# Patient Record
Sex: FEMALE
[De-identification: names, ages, dates, MRNs, and addresses within clinical notes are randomized per-mention and may not be internally consistent; named-entity substitution may affect disease eponyms.]

---

## 2019-02-25 ENCOUNTER — HOSPITAL ENCOUNTER (EMERGENCY)
Dept: HOSPITAL 31 - C.ER | Age: 79
Discharge: HOME | End: 2019-02-25
Payer: MEDICARE

## 2019-02-25 VITALS — HEART RATE: 84 BPM | TEMPERATURE: 97.6 F | DIASTOLIC BLOOD PRESSURE: 75 MMHG | SYSTOLIC BLOOD PRESSURE: 144 MMHG

## 2019-02-25 VITALS — RESPIRATION RATE: 16 BRPM

## 2019-02-25 VITALS — OXYGEN SATURATION: 99 %

## 2019-02-25 DIAGNOSIS — R42: Primary | ICD-10-CM

## 2019-02-25 NOTE — C.PDOC
Time Seen by Provider: 02/25/19 17:41


Chief Complaint (Nursing): Dizziness/Lightheaded





Past Medical History


Vital Signs: 





                                Last Vital Signs











Temp  98.3 F   02/25/19 17:33


 


Pulse  70   02/25/19 17:33


 


Resp  18   02/25/19 17:33


 


BP  152/69 H  02/25/19 17:33


 


Pulse Ox  99   02/25/19 17:33














- Medical History


PMH: GERD, HTN, Hyperthyroidism, Migraine


Surgical History: Tonsillectomy





- Social History


Hx Alcohol Use: No


Hx Substance Use: No





ED Course And Treatment


O2 Sat by Pulse Oximetry: 99


Reevaluation Time: 19:03


Reassessment Condition: Improved (positional vertigo resolved)





Medical Decision Making


Medical Decision Making: 





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


Undertreated @ home.


improved with ED tx


home regimen augmented





Disposition


Doctor Will See Patient In The: Office


Counseled Patient/Family Regarding: Studies Performed, Diagnosis





- Disposition


Disposition: HOME/ ROUTINE


Disposition Time: 19:05


Condition: GOOD





- Clinical Impression


Clinical Impression: 


 Vertigo

## 2019-02-25 NOTE — C.PDOC
History Of Present Illness


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 

who presents to the ED c/o positional vertigo that began 3 days ago. Patient 

states that she takes Meclizine 1 to 2 times daily with transient improvement. 

She reports that her symptoms are worse when sitting up from a laying position 

but improve with standing up. Patient denies any headaches, nausea, vomiting, or

recent viral syndrome. 





Time Seen by Provider: 02/25/19 17:41


Chief Complaint (Nursing): Dizziness/Lightheaded


History Per: Patient


History/Exam Limitations: no limitations


Onset/Duration Of Symptoms: Days (3)


Current Symptoms Are (Timing): Still Present


Associated Symptoms Preceding Syncopal Episode: Other (better with standing 

worse with sitting up from a laying position )


Recent travel outside of the [place]: No


Additional History Per: Patient





Past Medical History


Reviewed: Historical Data, Nursing Documentation, Vital Signs


Vital Signs: 





                                Last Vital Signs











Temp  98.3 F   02/25/19 17:33


 


Pulse  70   02/25/19 17:33


 


Resp  18   02/25/19 17:33


 


BP  152/69 H  02/25/19 17:33


 


Pulse Ox  99   02/25/19 17:33














- Medical History


PMH: GERD, HTN, Hyperthyroidism, Migraine


Surgical History: Tonsillectomy


Family History: States: No Known Family Hx





- Social History


Hx Alcohol Use: No


Hx Substance Use: No





Review Of Systems


Gastrointestinal: Negative for: Nausea, Vomiting


Neurological: Positive for: Other (positional vertigo).  Negative for: Headache





Physical Exam





- Physical Exam


Appears: Non-toxic, No Acute Distress


Skin: Normal Color, Warm, Dry


Head: Atraumatic, Normacephalic


Eye(s): bilateral: Normal Inspection, Other (no sticking nystagmus )


Ear(s): Bilateral: Normal


Nose: Normal


Oral Mucosa: Moist


Throat: Normal


Neck: Normal ROM, Supple


Chest: Symmetrical, No Deformity


Cardiovascular: Rhythm Regular


Respiratory: Normal Breath Sounds


Gastrointestinal/Abdominal: Soft


Extremity: Normal ROM


Neurological/Psych: Oriented x3, Normal Speech, Normal Cognition, Normal Cranial

Nerves, Normal Motor, Normal Sensation, Normal Reflexes





ED Course And Treatment


O2 Sat by Pulse Oximetry: 99 (on RA)


Pulse Ox Interpretation: Normal





Medical Decision Making


Medical Decision Making: 


Plan:


Motrin Tab 600mg PO


Antivert 50mg PO








Disposition





- Disposition





- Scribe Statement


The provider has reviewed the documentation as recorded by the Scribdianne Vargas





All medical record entries made by the Scribe were at my direction and 

personally dictated by me. I have reviewed the chart and agree that the record 

accurately reflects my personal performance of the history, physical exam, 

medical decision making, and the department course for this patient. I have also

personally directed, reviewed, and agree with the discharge instructions and 

disposition.